# Patient Record
Sex: MALE | Race: OTHER | HISPANIC OR LATINO
[De-identification: names, ages, dates, MRNs, and addresses within clinical notes are randomized per-mention and may not be internally consistent; named-entity substitution may affect disease eponyms.]

---

## 2018-09-12 ENCOUNTER — APPOINTMENT (OUTPATIENT)
Dept: OTHER | Facility: CLINIC | Age: 48
End: 2018-09-12
Payer: COMMERCIAL

## 2018-09-12 VITALS
SYSTOLIC BLOOD PRESSURE: 127 MMHG | OXYGEN SATURATION: 97 % | DIASTOLIC BLOOD PRESSURE: 87 MMHG | WEIGHT: 300 LBS | BODY MASS INDEX: 42.95 KG/M2 | HEIGHT: 70 IN | RESPIRATION RATE: 16 BRPM | HEART RATE: 73 BPM

## 2018-09-12 PROCEDURE — 96150: CPT

## 2018-09-12 PROCEDURE — 99396 PREV VISIT EST AGE 40-64: CPT

## 2018-09-12 PROCEDURE — 94010 BREATHING CAPACITY TEST: CPT

## 2018-09-13 LAB
ALBUMIN SERPL ELPH-MCNC: 4.7 G/DL
ALP BLD-CCNC: 120 U/L
ALT SERPL-CCNC: 52 U/L
ANION GAP SERPL CALC-SCNC: 16 MMOL/L
APPEARANCE: CLEAR
AST SERPL-CCNC: 28 U/L
BASOPHILS # BLD AUTO: 0.1 K/UL
BASOPHILS NFR BLD AUTO: 0.9 %
BILIRUB SERPL-MCNC: 0.4 MG/DL
BILIRUBIN URINE: NEGATIVE
BLOOD URINE: NEGATIVE
BUN SERPL-MCNC: 14 MG/DL
CALCIUM SERPL-MCNC: 10 MG/DL
CHLORIDE SERPL-SCNC: 93 MMOL/L
CHOLEST SERPL-MCNC: 198 MG/DL
CHOLEST/HDLC SERPL: 4.4 RATIO
CO2 SERPL-SCNC: 27 MMOL/L
COLOR: YELLOW
CREAT SERPL-MCNC: 0.85 MG/DL
EOSINOPHIL # BLD AUTO: 0.32 K/UL
EOSINOPHIL NFR BLD AUTO: 3 %
GLUCOSE QUALITATIVE U: 1000 MG/DL
GLUCOSE SERPL-MCNC: 268 MG/DL
HCT VFR BLD CALC: 47 %
HDLC SERPL-MCNC: 45 MG/DL
HGB BLD-MCNC: 15.2 G/DL
IMM GRANULOCYTES NFR BLD AUTO: 0.3 %
KETONES URINE: NEGATIVE
LDLC SERPL CALC-MCNC: 120 MG/DL
LEUKOCYTE ESTERASE URINE: NEGATIVE
LYMPHOCYTES # BLD AUTO: 2.75 K/UL
LYMPHOCYTES NFR BLD AUTO: 26.1 %
MAN DIFF?: NORMAL
MCHC RBC-ENTMCNC: 28.7 PG
MCHC RBC-ENTMCNC: 32.3 GM/DL
MCV RBC AUTO: 88.7 FL
MONOCYTES # BLD AUTO: 0.71 K/UL
MONOCYTES NFR BLD AUTO: 6.7 %
NEUTROPHILS # BLD AUTO: 6.62 K/UL
NEUTROPHILS NFR BLD AUTO: 63 %
NITRITE URINE: NEGATIVE
PH URINE: 5.5
PLATELET # BLD AUTO: 281 K/UL
POTASSIUM SERPL-SCNC: 5.1 MMOL/L
PROT SERPL-MCNC: 7.2 G/DL
PROTEIN URINE: NEGATIVE MG/DL
RBC # BLD: 5.3 M/UL
RBC # FLD: 13.2 %
SODIUM SERPL-SCNC: 136 MMOL/L
SPECIFIC GRAVITY URINE: 1.04
TRIGL SERPL-MCNC: 166 MG/DL
UROBILINOGEN URINE: NEGATIVE MG/DL
WBC # FLD AUTO: 10.53 K/UL

## 2018-10-29 ENCOUNTER — FORM ENCOUNTER (OUTPATIENT)
Age: 48
End: 2018-10-29

## 2019-02-27 ENCOUNTER — FORM ENCOUNTER (OUTPATIENT)
Age: 49
End: 2019-02-27

## 2019-08-14 ENCOUNTER — APPOINTMENT (OUTPATIENT)
Dept: OTHER | Facility: CLINIC | Age: 49
End: 2019-08-14
Payer: COMMERCIAL

## 2019-08-14 VITALS
RESPIRATION RATE: 16 BRPM | HEART RATE: 72 BPM | HEIGHT: 70 IN | OXYGEN SATURATION: 97 % | DIASTOLIC BLOOD PRESSURE: 59 MMHG | WEIGHT: 300 LBS | BODY MASS INDEX: 42.95 KG/M2 | SYSTOLIC BLOOD PRESSURE: 128 MMHG

## 2019-08-14 PROCEDURE — 99396 PREV VISIT EST AGE 40-64: CPT | Mod: 25

## 2019-08-14 PROCEDURE — 94010 BREATHING CAPACITY TEST: CPT

## 2019-08-14 NOTE — DISCUSSION/SUMMARY
[Patient seen for WTC Monitoring ___] : Patient was seen for WTC monitoring [unfilled] [Please See Note in Chart and Documentation in Trial DB] : Please see note in chart and documentation in Trial DB. [FreeTextEntry3] : certified for ptsd\par s. patient here for monitoring visit. overall stable. has been on f/u with an endocrinologist, has been diagnosed with diabetes and is on meds for this. is also on meds for thyroid.\par pt is not working at this time, he was working part time. \par o. pt is overweight. no difficulty breathing. no swelling of turbinates, no tender at sinuses. orpharynx overall normal. no lymph nodes. lungs clear to p/a. cor rrr no murmurs. abdomen: no masses, no tender. no edema. \par sprio today: fvc 3.95, 79%; fev1 3.16, 79%; ratio 80, 101%; flow volume normal. fet 6 sec. lss of 84 ml/yr fev1 since 2014 (fev1 2014: 3.58)\par a and p. doucumentatin completed.

## 2019-08-15 LAB
ALBUMIN SERPL ELPH-MCNC: 4.2 G/DL
ALP BLD-CCNC: 100 U/L
ALT SERPL-CCNC: 31 U/L
ANION GAP SERPL CALC-SCNC: 14 MMOL/L
APPEARANCE: CLEAR
AST SERPL-CCNC: 17 U/L
BASOPHILS # BLD AUTO: 0.17 K/UL
BASOPHILS NFR BLD AUTO: 1.4 %
BILIRUB SERPL-MCNC: 0.2 MG/DL
BILIRUBIN URINE: NEGATIVE
BLOOD URINE: NEGATIVE
BUN SERPL-MCNC: 14 MG/DL
CALCIUM SERPL-MCNC: 9.7 MG/DL
CHLORIDE SERPL-SCNC: 98 MMOL/L
CHOLEST SERPL-MCNC: 188 MG/DL
CHOLEST/HDLC SERPL: 4.2 RATIO
CO2 SERPL-SCNC: 25 MMOL/L
COLOR: YELLOW
CREAT SERPL-MCNC: 0.83 MG/DL
EOSINOPHIL # BLD AUTO: 0.72 K/UL
EOSINOPHIL NFR BLD AUTO: 6 %
GLUCOSE QUALITATIVE U: ABNORMAL
GLUCOSE SERPL-MCNC: 256 MG/DL
HCT VFR BLD CALC: 45.9 %
HDLC SERPL-MCNC: 45 MG/DL
HGB BLD-MCNC: 15 G/DL
IMM GRANULOCYTES NFR BLD AUTO: 0.2 %
KETONES URINE: NEGATIVE
LDLC SERPL CALC-MCNC: 107 MG/DL
LEUKOCYTE ESTERASE URINE: NEGATIVE
LYMPHOCYTES # BLD AUTO: 2.99 K/UL
LYMPHOCYTES NFR BLD AUTO: 24.8 %
MAN DIFF?: NORMAL
MCHC RBC-ENTMCNC: 28.6 PG
MCHC RBC-ENTMCNC: 32.7 GM/DL
MCV RBC AUTO: 87.6 FL
MONOCYTES # BLD AUTO: 0.78 K/UL
MONOCYTES NFR BLD AUTO: 6.5 %
NEUTROPHILS # BLD AUTO: 7.38 K/UL
NEUTROPHILS NFR BLD AUTO: 61.1 %
NITRITE URINE: NEGATIVE
PH URINE: 6
PLATELET # BLD AUTO: 293 K/UL
POTASSIUM SERPL-SCNC: 5.4 MMOL/L
PROT SERPL-MCNC: 7.2 G/DL
PROTEIN URINE: NORMAL
RBC # BLD: 5.24 M/UL
RBC # FLD: 12.6 %
SODIUM SERPL-SCNC: 137 MMOL/L
SPECIFIC GRAVITY URINE: 1.03
TRIGL SERPL-MCNC: 180 MG/DL
UROBILINOGEN URINE: NORMAL
WBC # FLD AUTO: 12.07 K/UL

## 2022-01-19 ENCOUNTER — APPOINTMENT (OUTPATIENT)
Dept: OTHER | Facility: CLINIC | Age: 52
End: 2022-01-19
Payer: COMMERCIAL

## 2022-01-19 ENCOUNTER — FORM ENCOUNTER (OUTPATIENT)
Age: 52
End: 2022-01-19

## 2022-01-19 DIAGNOSIS — Z04.9 ENCOUNTER FOR EXAMINATION AND OBSERVATION FOR UNSPECIFIED REASON: ICD-10-CM

## 2022-01-19 PROCEDURE — 99396 PREV VISIT EST AGE 40-64: CPT | Mod: 95

## 2022-01-19 NOTE — DISCUSSION/SUMMARY
[Home] : at home, [unfilled] , at the time of the visit. [Other Location: e.g. Home (Enter Location, City,State)___] : at [unfilled] [Verbal consent obtained from patient] : the patient, [unfilled] [Patient seen for WTC Monitoring ___] : Patient was seen for WTC monitoring [unfilled] [Please See Note in Chart and Documentation in Trial DB] : Please see note in chart and documentation in Trial DB. [FreeTextEntry3] : certified for ptsd\par visit conducted in [place]\par s. patient reports occasional episodes of sudden sob, some 3-4 x week. he also reports difficulty sleeping. has been having a relapse of his ptsd symptoms for the past some 4 mo. had called psych only to be told that the doctor is not treating [facility] patients any longer. continues f/u with an endocrinologist for diabetes and thyroid.\par pt is not working at this time, he was working part time, he is looking for work. \par o. over telephone patient sounded in no difficulty breathing, he was alert and oriented x 3 and his responses wer adequate.  \par a and p. doucumentatin completed. will get cxr, no pft during covid. colonscopy next yr. \par i spent some 5 min on the phone with pt.

## 2024-06-12 ENCOUNTER — APPOINTMENT (OUTPATIENT)
Dept: RADIOLOGY | Facility: CLINIC | Age: 54
End: 2024-06-12

## 2024-06-12 ENCOUNTER — APPOINTMENT (OUTPATIENT)
Dept: OTHER | Facility: CLINIC | Age: 54
End: 2024-06-12
Payer: COMMERCIAL

## 2024-06-12 VITALS
RESPIRATION RATE: 16 BRPM | OXYGEN SATURATION: 100 % | HEIGHT: 69 IN | BODY MASS INDEX: 38.8 KG/M2 | WEIGHT: 262 LBS | DIASTOLIC BLOOD PRESSURE: 92 MMHG | SYSTOLIC BLOOD PRESSURE: 150 MMHG | TEMPERATURE: 97.6 F | HEART RATE: 79 BPM

## 2024-06-12 DIAGNOSIS — Z03.89 ENCOUNTER FOR OBSERVATION FOR OTHER SUSPECTED DISEASES AND CONDITIONS RULED OUT: ICD-10-CM

## 2024-06-12 DIAGNOSIS — R05.9 COUGH, UNSPECIFIED: ICD-10-CM

## 2024-06-12 PROCEDURE — 94010 BREATHING CAPACITY TEST: CPT

## 2024-06-12 PROCEDURE — 99396 PREV VISIT EST AGE 40-64: CPT | Mod: 25

## 2024-06-12 PROCEDURE — 71046 X-RAY EXAM CHEST 2 VIEWS: CPT

## 2024-06-12 RX ORDER — BUDESONIDE 32 UG/1
32 SPRAY, METERED NASAL DAILY
Qty: 1 | Refills: 6 | Status: ACTIVE | COMMUNITY
Start: 2024-06-12 | End: 1900-01-01

## 2024-06-12 NOTE — DISCUSSION/SUMMARY
[Patient seen for WTC Monitoring ___] : Patient was seen for WTC monitoring [unfilled] [Please See Note in Chart and Documentation in Trial DB] : Please see note in chart and documentation in Trial DB. [FreeTextEntry3] : s. patient's conditions have been overall stable. reports the need to frequently clear his throat, with throat congestion. has been having this for the past some 3 ys. otherwise stable.  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  pt has pmd.  o. overweight. no difficulty breathing. no tender at sinuses. oropharynx overall clear. no lymph nodes. lungs clear to p/a. cor rrr no murmurs. abdomen: some tenderness at flanks, no rebound. no edema.  rodolfo: poor quality, not a single good trial, overall normal.  a and p. documentation completed. chronic rhinpharyngitis, most probably environmental.

## 2024-06-13 LAB
ALBUMIN SERPL ELPH-MCNC: 4.2 G/DL
ALP BLD-CCNC: 86 U/L
ALT SERPL-CCNC: 15 U/L
ANION GAP SERPL CALC-SCNC: 12 MMOL/L
APPEARANCE: CLEAR
AST SERPL-CCNC: 14 U/L
BASOPHILS # BLD AUTO: 0.11 K/UL
BASOPHILS NFR BLD AUTO: 1.1 %
BILIRUB SERPL-MCNC: 0.3 MG/DL
BILIRUBIN URINE: NEGATIVE
BLOOD URINE: NEGATIVE
BUN SERPL-MCNC: 11 MG/DL
CALCIUM SERPL-MCNC: 9.5 MG/DL
CHLORIDE SERPL-SCNC: 99 MMOL/L
CHOLEST SERPL-MCNC: 159 MG/DL
CO2 SERPL-SCNC: 24 MMOL/L
COLOR: YELLOW
CREAT SERPL-MCNC: 0.77 MG/DL
EGFR: 108 ML/MIN/1.73M2
EOSINOPHIL # BLD AUTO: 0.23 K/UL
EOSINOPHIL NFR BLD AUTO: 2.3 %
GLUCOSE QUALITATIVE U: 100 MG/DL
GLUCOSE SERPL-MCNC: 169 MG/DL
HCT VFR BLD CALC: 43.4 %
HDLC SERPL-MCNC: 44 MG/DL
HGB BLD-MCNC: 14 G/DL
IMM GRANULOCYTES NFR BLD AUTO: 0.2 %
KETONES URINE: NEGATIVE MG/DL
LDLC SERPL CALC-MCNC: 94 MG/DL
LEUKOCYTE ESTERASE URINE: NEGATIVE
LYMPHOCYTES # BLD AUTO: 2.51 K/UL
LYMPHOCYTES NFR BLD AUTO: 25.6 %
MAN DIFF?: NORMAL
MCHC RBC-ENTMCNC: 28.2 PG
MCHC RBC-ENTMCNC: 32.3 GM/DL
MCV RBC AUTO: 87.5 FL
MONOCYTES # BLD AUTO: 0.62 K/UL
MONOCYTES NFR BLD AUTO: 6.3 %
NEUTROPHILS # BLD AUTO: 6.32 K/UL
NEUTROPHILS NFR BLD AUTO: 64.5 %
NITRITE URINE: NEGATIVE
NONHDLC SERPL-MCNC: 115 MG/DL
PH URINE: 6
PLATELET # BLD AUTO: 314 K/UL
POTASSIUM SERPL-SCNC: 4.8 MMOL/L
PROT SERPL-MCNC: 7 G/DL
PROTEIN URINE: NEGATIVE MG/DL
RBC # BLD: 4.96 M/UL
RBC # FLD: 13 %
SODIUM SERPL-SCNC: 136 MMOL/L
SPECIFIC GRAVITY URINE: 1.02
TRIGL SERPL-MCNC: 120 MG/DL
UROBILINOGEN URINE: 0.2 MG/DL
WBC # FLD AUTO: 9.81 K/UL

## 2025-05-14 ENCOUNTER — APPOINTMENT (OUTPATIENT)
Dept: OTHER | Facility: CLINIC | Age: 55
End: 2025-05-14
Payer: COMMERCIAL

## 2025-05-14 ENCOUNTER — NON-APPOINTMENT (OUTPATIENT)
Age: 55
End: 2025-05-14

## 2025-05-14 VITALS
DIASTOLIC BLOOD PRESSURE: 88 MMHG | HEART RATE: 74 BPM | TEMPERATURE: 98.2 F | BODY MASS INDEX: 38.49 KG/M2 | OXYGEN SATURATION: 98 % | HEIGHT: 68 IN | WEIGHT: 254 LBS | SYSTOLIC BLOOD PRESSURE: 143 MMHG

## 2025-05-14 DIAGNOSIS — Z04.9 ENCOUNTER FOR EXAMINATION AND OBSERVATION FOR UNSPECIFIED REASON: ICD-10-CM

## 2025-05-14 PROCEDURE — 94010 BREATHING CAPACITY TEST: CPT

## 2025-05-14 PROCEDURE — 99396 PREV VISIT EST AGE 40-64: CPT | Mod: 25

## 2025-05-15 LAB
ALBUMIN SERPL ELPH-MCNC: 4.4 G/DL
ALP BLD-CCNC: 105 U/L
ALT SERPL-CCNC: 14 U/L
ANION GAP SERPL CALC-SCNC: 18 MMOL/L
APPEARANCE: CLEAR
AST SERPL-CCNC: 12 U/L
BASOPHILS # BLD AUTO: 0.14 K/UL
BASOPHILS NFR BLD AUTO: 1.2 %
BILIRUB SERPL-MCNC: 0.5 MG/DL
BILIRUBIN URINE: NEGATIVE
BLOOD URINE: NEGATIVE
BUN SERPL-MCNC: 15 MG/DL
CALCIUM SERPL-MCNC: 9.7 MG/DL
CHLORIDE SERPL-SCNC: 99 MMOL/L
CHOLEST SERPL-MCNC: 181 MG/DL
CO2 SERPL-SCNC: 23 MMOL/L
COLOR: YELLOW
CREAT SERPL-MCNC: 0.78 MG/DL
EGFRCR SERPLBLD CKD-EPI 2021: 107 ML/MIN/1.73M2
EOSINOPHIL # BLD AUTO: 0.37 K/UL
EOSINOPHIL NFR BLD AUTO: 3.1 %
GLUCOSE QUALITATIVE U: >=1000 MG/DL
GLUCOSE SERPL-MCNC: 284 MG/DL
HCT VFR BLD CALC: 47.6 %
HDLC SERPL-MCNC: 46 MG/DL
HGB BLD-MCNC: 15.2 G/DL
IMM GRANULOCYTES NFR BLD AUTO: 0.4 %
KETONES URINE: ABNORMAL MG/DL
LDLC SERPL-MCNC: 111 MG/DL
LEUKOCYTE ESTERASE URINE: NEGATIVE
LYMPHOCYTES # BLD AUTO: 2.76 K/UL
LYMPHOCYTES NFR BLD AUTO: 23.1 %
MAN DIFF?: NORMAL
MCHC RBC-ENTMCNC: 28.2 PG
MCHC RBC-ENTMCNC: 31.9 G/DL
MCV RBC AUTO: 88.3 FL
MONOCYTES # BLD AUTO: 0.75 K/UL
MONOCYTES NFR BLD AUTO: 6.3 %
NEUTROPHILS # BLD AUTO: 7.89 K/UL
NEUTROPHILS NFR BLD AUTO: 65.9 %
NITRITE URINE: NEGATIVE
NONHDLC SERPL-MCNC: 135 MG/DL
PH URINE: 5.5
PLATELET # BLD AUTO: 309 K/UL
POTASSIUM SERPL-SCNC: 4.3 MMOL/L
PROT SERPL-MCNC: 7.3 G/DL
PROTEIN URINE: NORMAL MG/DL
RBC # BLD: 5.39 M/UL
RBC # FLD: 12.4 %
SODIUM SERPL-SCNC: 139 MMOL/L
SPECIFIC GRAVITY URINE: >1.03
TRIGL SERPL-MCNC: 132 MG/DL
UROBILINOGEN URINE: 1 MG/DL
WBC # FLD AUTO: 11.96 K/UL

## 2025-08-19 ENCOUNTER — NON-APPOINTMENT (OUTPATIENT)
Age: 55
End: 2025-08-19